# Patient Record
Sex: FEMALE | Race: BLACK OR AFRICAN AMERICAN | NOT HISPANIC OR LATINO | URBAN - METROPOLITAN AREA
[De-identification: names, ages, dates, MRNs, and addresses within clinical notes are randomized per-mention and may not be internally consistent; named-entity substitution may affect disease eponyms.]

---

## 2020-10-02 ENCOUNTER — OFFICE VISIT (OUTPATIENT)
Dept: URBAN - METROPOLITAN AREA CLINIC 84 | Facility: CLINIC | Age: 35
End: 2020-10-02
Payer: OTHER GOVERNMENT

## 2020-10-02 ENCOUNTER — WEB ENCOUNTER (OUTPATIENT)
Dept: URBAN - METROPOLITAN AREA CLINIC 84 | Facility: CLINIC | Age: 35
End: 2020-10-02

## 2020-10-02 DIAGNOSIS — K60.2 ANAL FISSURE: ICD-10-CM

## 2020-10-02 DIAGNOSIS — K60.1 CHRONIC ANAL FISSURE: ICD-10-CM

## 2020-10-02 DIAGNOSIS — K64.8 INTERNAL HEMORRHOID: ICD-10-CM

## 2020-10-02 PROCEDURE — 99244 OFF/OP CNSLTJ NEW/EST MOD 40: CPT | Performed by: INTERNAL MEDICINE

## 2020-10-02 PROCEDURE — G8483 FLU IMM NO ADMIN DOC REA: HCPCS | Performed by: INTERNAL MEDICINE

## 2020-10-02 PROCEDURE — G9903 PT SCRN TBCO ID AS NON USER: HCPCS | Performed by: INTERNAL MEDICINE

## 2020-10-02 PROCEDURE — 99204 OFFICE O/P NEW MOD 45 MIN: CPT | Performed by: INTERNAL MEDICINE

## 2020-10-02 PROCEDURE — G8417 CALC BMI ABV UP PARAM F/U: HCPCS | Performed by: INTERNAL MEDICINE

## 2020-10-02 PROCEDURE — 1036F TOBACCO NON-USER: CPT | Performed by: INTERNAL MEDICINE

## 2020-10-02 PROCEDURE — G8427 DOCREV CUR MEDS BY ELIG CLIN: HCPCS | Performed by: INTERNAL MEDICINE

## 2020-10-02 NOTE — HPI-TODAY'S VISIT:
34 year old woman referred by Solange Tuttle with GI complaints.  She is suffering from hemorrhoids.  She has rectal pain.  This gets worse when she has a BM. She was having constipation.  She was having strain.  The pain is constsant but it gets worse with a BM.  She has been using a stool softener and now has a daily BM.  She denies incomplete evacaution.  She describes a tearing/sharp sensation when she has a BM.  SHe denies LGI bleed.  She has tried topical Lidocaine and Proctofoam without relief.  She also tried Prep H.  The Lidocaine did give her some temporary relief.  She denies anroexia or weight loss.  SHe denies abdominal pain.  SHe denies UGI symptoms.  There is nno FHx of colon cancer or IBD.  SHe does not think that her PCP did a rectal exam.  She has not had prior Flex Sig or colonoscopy

## 2020-10-02 NOTE — PHYSICAL EXAM GASTROINTESTINAL
Abdomen , soft, nontender, nondistended , no guarding or rigidity , no masses palpable , normal bowel sounds , Liver and Spleen , no hepatomegaly present , no hepatosplenomegaly , liver nontender , spleen not palpable , Rectal Chaperone present.  Increased sphincter tone.  Grade 1 internal hemorrhoids.  No rectal masses or bleeding present.  Posterior anal fissure

## 2020-10-29 ENCOUNTER — OFFICE VISIT (OUTPATIENT)
Dept: URBAN - METROPOLITAN AREA CLINIC 46 | Facility: CLINIC | Age: 35
End: 2020-10-29

## 2020-11-19 ENCOUNTER — OFFICE VISIT (OUTPATIENT)
Dept: URBAN - METROPOLITAN AREA CLINIC 84 | Facility: CLINIC | Age: 35
End: 2020-11-19
Payer: OTHER GOVERNMENT

## 2020-11-19 VITALS
BODY MASS INDEX: 38.77 KG/M2 | TEMPERATURE: 98.3 F | HEART RATE: 66 BPM | WEIGHT: 247 LBS | DIASTOLIC BLOOD PRESSURE: 80 MMHG | RESPIRATION RATE: 18 BRPM | SYSTOLIC BLOOD PRESSURE: 126 MMHG | HEIGHT: 67 IN

## 2020-11-19 DIAGNOSIS — K60.2 ANAL FISSURE: ICD-10-CM

## 2020-11-19 PROCEDURE — G8427 DOCREV CUR MEDS BY ELIG CLIN: HCPCS | Performed by: INTERNAL MEDICINE

## 2020-11-19 PROCEDURE — 99213 OFFICE O/P EST LOW 20 MIN: CPT | Performed by: INTERNAL MEDICINE

## 2020-11-19 PROCEDURE — G9903 PT SCRN TBCO ID AS NON USER: HCPCS | Performed by: INTERNAL MEDICINE

## 2020-11-19 PROCEDURE — G8417 CALC BMI ABV UP PARAM F/U: HCPCS | Performed by: INTERNAL MEDICINE

## 2020-11-19 PROCEDURE — G8483 FLU IMM NO ADMIN DOC REA: HCPCS | Performed by: INTERNAL MEDICINE

## 2020-11-19 NOTE — PHYSICAL EXAM GASTROINTESTINAL
Abdomen , soft, nontender, nondistended , no guarding or rigidity , no masses palpable , normal bowel sounds , Liver and Spleen , no hepatomegaly present , no hepatosplenomegaly , liver nontender , spleen not palpable , Rectal ,Chaperone present.  Increased sphincter tone , no internal hemorrhoids, rectal masses or bleeding present.  Anal fissure

## 2020-11-19 NOTE — HPI-TODAY'S VISIT:
Patient seen 6 weeks ago.  She used the Nifedipine ointment.  She has soft stool.  She did not have to use the Miralax.  She has regular BM's.  She denies strain or incomplete evacuation.  She does have an irritaiton after a BM.  She denies rectal protrusion.  She does have some strain with a BM.  She denies rectal bleed.  She denies abdominal pain.  She denies anorexia or weight loss.  She denies UGI symptoms.

## 2021-03-19 ENCOUNTER — OFFICE VISIT (OUTPATIENT)
Dept: URBAN - METROPOLITAN AREA CLINIC 84 | Facility: CLINIC | Age: 36
End: 2021-03-19

## 2021-04-23 ENCOUNTER — OFFICE VISIT (OUTPATIENT)
Dept: URBAN - METROPOLITAN AREA CLINIC 84 | Facility: CLINIC | Age: 36
End: 2021-04-23
Payer: OTHER GOVERNMENT

## 2021-04-23 ENCOUNTER — WEB ENCOUNTER (OUTPATIENT)
Dept: URBAN - METROPOLITAN AREA CLINIC 84 | Facility: CLINIC | Age: 36
End: 2021-04-23

## 2021-04-23 DIAGNOSIS — K60.2 ANAL FISSURE: ICD-10-CM

## 2021-04-23 DIAGNOSIS — K64.8 INTERNAL HEMORRHOID: ICD-10-CM

## 2021-04-23 PROCEDURE — 99214 OFFICE O/P EST MOD 30 MIN: CPT | Performed by: INTERNAL MEDICINE

## 2021-04-23 RX ORDER — PHENTERMINE HYDROCHLORIDE 37.5 MG/1
TABLET ORAL
Qty: 30 UNSPECIFIED | Status: DISCONTINUED | COMMUNITY

## 2021-04-23 RX ORDER — LACTULOSE 10 G/15ML
SOLUTION ORAL; RECTAL
Qty: 450 UNSPECIFIED | Status: DISCONTINUED | COMMUNITY

## 2021-04-23 RX ORDER — DICLOFENAC SODIUM 10 MG/G
GEL TOPICAL
Qty: 100 UNSPECIFIED | Status: DISCONTINUED | COMMUNITY

## 2021-04-23 RX ORDER — NAPROXEN 500 MG/1
TABLET ORAL
Qty: 40 UNSPECIFIED | Status: DISCONTINUED | COMMUNITY

## 2021-04-23 NOTE — PHYSICAL EXAM GASTROINTESTINAL
Abdomen , soft, nontender, nondistended , no guarding or rigidity , no masses palpable , normal bowel sounds , Liver and Spleen , no hepatomegaly present , no hepatosplenomegaly , liver nontender , spleen not palpable , Abdomen , soft, nontender, nondistended , no guarding or rigidity , no masses palpable , normal bowel sounds , Liver and Spleen , no hepatomegaly present , no hepatosplenomegaly , liver nontender , spleen not palpable
nanda

## 2021-04-23 NOTE — HPI-TODAY'S VISIT:
Patient last seen 11/2020.  She ahs not been seen by [facility].  She only has occasional rectal pain.  She ahs regular BM's.  She denies strain.  She denies rectal bleed.  She drinks a tea that helps with her bowels. She does use the Nifedipine ointment PRN and Prep H PRN.  She denies abdominal pain  SHe denies anorexia or weight loss.  SHe dneies UGI symptoms

## 2021-05-17 ENCOUNTER — OFFICE VISIT (OUTPATIENT)
Dept: URBAN - METROPOLITAN AREA CLINIC 84 | Facility: CLINIC | Age: 36
End: 2021-05-17
Payer: OTHER GOVERNMENT

## 2021-05-17 ENCOUNTER — WEB ENCOUNTER (OUTPATIENT)
Dept: URBAN - METROPOLITAN AREA CLINIC 84 | Facility: CLINIC | Age: 36
End: 2021-05-17

## 2021-05-17 ENCOUNTER — DASHBOARD ENCOUNTERS (OUTPATIENT)
Age: 36
End: 2021-05-17

## 2021-05-17 VITALS
BODY MASS INDEX: 34.78 KG/M2 | WEIGHT: 221.6 LBS | DIASTOLIC BLOOD PRESSURE: 78 MMHG | HEART RATE: 76 BPM | TEMPERATURE: 97.2 F | SYSTOLIC BLOOD PRESSURE: 117 MMHG | HEIGHT: 67 IN

## 2021-05-17 DIAGNOSIS — K64.5 THROMBOSED EXTERNAL HEMORRHOID: ICD-10-CM

## 2021-05-17 PROCEDURE — 99213 OFFICE O/P EST LOW 20 MIN: CPT | Performed by: INTERNAL MEDICINE

## 2021-05-17 NOTE — HPI-TODAY'S VISIT:
Pt presents for evaluation of rectal pain for 4 days and getting worst. No response to creams/suppositories. Pt's photo revealed prolapsed hemorrhoids that appear to be thrombosed.

## 2021-05-24 ENCOUNTER — OFFICE VISIT (OUTPATIENT)
Dept: URBAN - METROPOLITAN AREA SURGERY CENTER 20 | Facility: SURGERY CENTER | Age: 36
End: 2021-05-24

## 2021-08-28 ENCOUNTER — TELEPHONE ENCOUNTER (OUTPATIENT)
Dept: URBAN - METROPOLITAN AREA CLINIC 13 | Facility: CLINIC | Age: 36
End: 2021-08-28

## 2021-08-29 ENCOUNTER — TELEPHONE ENCOUNTER (OUTPATIENT)
Dept: URBAN - METROPOLITAN AREA CLINIC 13 | Facility: CLINIC | Age: 36
End: 2021-08-29